# Patient Record
Sex: FEMALE | Race: BLACK OR AFRICAN AMERICAN | URBAN - METROPOLITAN AREA
[De-identification: names, ages, dates, MRNs, and addresses within clinical notes are randomized per-mention and may not be internally consistent; named-entity substitution may affect disease eponyms.]

---

## 2023-02-24 ENCOUNTER — OFFICE VISIT (OUTPATIENT)
Dept: URBAN - METROPOLITAN AREA CLINIC 32 | Facility: CLINIC | Age: 48
End: 2023-02-24
Payer: MEDICAID

## 2023-02-24 DIAGNOSIS — H18.613 KERATOCONUS - BILATERAL: Primary | ICD-10-CM

## 2023-02-24 DIAGNOSIS — H52.223 REGULAR ASTIGMATISM, BILATERAL: ICD-10-CM

## 2023-02-24 PROCEDURE — 92004 COMPRE OPH EXAM NEW PT 1/>: CPT | Performed by: OPTOMETRIST

## 2023-02-24 ASSESSMENT — INTRAOCULAR PRESSURE
OD: 4
OS: 4

## 2023-02-24 NOTE — IMPRESSION/PLAN
Impression: Keratoconus - Bilateral: H18.523. Plan: Discussed diagnosis with patient. Discussed treatment options. Advised patient to use artificial tears PRN OU. Scleral lenses are medically necessary to achieve BCVA. Patient is a previous scleral lens wearer. Start new fit and order lenses.

## 2023-06-08 ENCOUNTER — OFFICE VISIT (OUTPATIENT)
Dept: URBAN - METROPOLITAN AREA CLINIC 32 | Facility: CLINIC | Age: 48
End: 2023-06-08
Payer: MEDICAID

## 2023-06-08 DIAGNOSIS — H18.613 KERATOCONUS - BILATERAL: Primary | ICD-10-CM

## 2023-06-08 PROCEDURE — 92012 INTRM OPH EXAM EST PATIENT: CPT | Performed by: OPTOMETRIST

## 2023-06-08 NOTE — IMPRESSION/PLAN
Impression: Keratoconus - Bilateral: H18.613. Plan: Discussed diagnosis with patient. Discussed treatment options. Advised patient to use artificial tears PRN OU. Scleral lenses are medically necessary to achieve BCVA. Make adjustments to lenses and mail lenses to patient. [place], patient understands she will need to follow up with Dr. Marge Garcia after receiving new lenses. Yes